# Patient Record
Sex: FEMALE | Race: WHITE | NOT HISPANIC OR LATINO
[De-identification: names, ages, dates, MRNs, and addresses within clinical notes are randomized per-mention and may not be internally consistent; named-entity substitution may affect disease eponyms.]

---

## 2020-01-20 ENCOUNTER — APPOINTMENT (OUTPATIENT)
Dept: ORTHOPEDIC SURGERY | Facility: CLINIC | Age: 60
End: 2020-01-20
Payer: COMMERCIAL

## 2020-01-20 VITALS
HEIGHT: 64 IN | DIASTOLIC BLOOD PRESSURE: 92 MMHG | BODY MASS INDEX: 27.31 KG/M2 | SYSTOLIC BLOOD PRESSURE: 134 MMHG | WEIGHT: 160 LBS | HEART RATE: 78 BPM

## 2020-01-20 DIAGNOSIS — Z78.9 OTHER SPECIFIED HEALTH STATUS: ICD-10-CM

## 2020-01-20 DIAGNOSIS — Z86.39 PERSONAL HISTORY OF OTHER ENDOCRINE, NUTRITIONAL AND METABOLIC DISEASE: ICD-10-CM

## 2020-01-20 PROBLEM — Z00.00 ENCOUNTER FOR PREVENTIVE HEALTH EXAMINATION: Status: ACTIVE | Noted: 2020-01-20

## 2020-01-20 PROCEDURE — 29125 APPL SHORT ARM SPLINT STATIC: CPT | Mod: RT

## 2020-01-20 PROCEDURE — 99204 OFFICE O/P NEW MOD 45 MIN: CPT | Mod: 25

## 2020-01-20 RX ORDER — ROSUVASTATIN CALCIUM 10 MG/1
10 TABLET, FILM COATED ORAL
Refills: 0 | Status: ACTIVE | COMMUNITY

## 2020-01-20 RX ORDER — SERTRALINE HYDROCHLORIDE 50 MG/1
50 TABLET, FILM COATED ORAL
Refills: 0 | Status: ACTIVE | COMMUNITY

## 2020-01-20 RX ORDER — ASPIRIN 81 MG
81 TABLET, DELAYED RELEASE (ENTERIC COATED) ORAL
Refills: 0 | Status: ACTIVE | COMMUNITY

## 2020-01-20 NOTE — HISTORY OF PRESENT ILLNESS
[Right] : right hand dominant [FreeTextEntry1] : She comes in today for evaluation of a right wrist fracture secondary to a fall that occurred 3 days ago. She was seen at [facility] where she had x-rays. She denies prior injuries. She rates her pain a 6 out of 10 at this time.\par \par She was referred by Dr. Lee.

## 2020-01-20 NOTE — END OF VISIT
[FreeTextEntry3] : All medical record entries made by the Scribe were at my, Dr. Morse, direction and personally dictated by me on 01/20/2020. I have reviewed the chart and agree that the record accurately reflects my personal performances of the history, physical exam, assessment, and plan. I have also personally directed, reviewed, and agreed with the chart.\par

## 2020-01-20 NOTE — PHYSICAL EXAM
[de-identified] : I reviewed PA, lateral and oblique radiographs of her right wrist from 3 days ago.  These demonstrate an intra-articular distal radius fracture with dorsal comminution.  There appears to be some loss of bone dorsally. [de-identified] : - Constitutional: This is a female in no obvious distress.  \par - Psych: Patient is alert and oriented to person, place and time.  Patient has a normal mood and affect.\par - Cardiovascular: Normal pulses throughout the upper extremities.  No significant varicosities are noted in the upper extremities. \par - Neuro: Strength and sensation are intact throughout the upper extremities.  Patient has normal coordination.\par - Respiratory:  Patient exhibits no evidence of shortness of breath or difficulty breathing.\par - Skin: No rashes, lesions, or other abnormalities are noted in the upper extremities.\par \par ---\par \par Examination of her right wrist after the splint was removed demonstrates diffuse swelling.  She is tender along the distal radius dorsally.  She has some limitation of terminal flexion and extension of the digits.  She is neurovascularly intact distally.

## 2020-01-20 NOTE — ADDENDUM
[FreeTextEntry1] : I, Darrel Pineda, acted solely as a scribe for Dr. Morse on this date 01/20/2020.\par

## 2020-01-20 NOTE — PROCEDURE
[FreeTextEntry1] : She was placed into a well-padded and well-molded right short arm reverse sugar tong splint.  She was instructed on elevation, ice and range of motion exercises to the digits.  She will follow-up in 3 days in the [place] office to review her CAT scan.

## 2020-01-20 NOTE — DISCUSSION/SUMMARY
[FreeTextEntry1] : She has findings consistent with an intra-articular right distal radius fracture with dorsal comminution.\par \par I had a discussion regarding today's visit, the diagnosis, and treatment recommendations / options. At this time recommended placement of a splint and I ordered a CT scan to better evaluate the displacement of her fracture.  She will follow-up after the CAT scan in 3 days to review the results and discuss treatment recommendations.\par \par The patient has agreed to this plan of management and has expressed full understanding.  All questions were fully answered to the patient's satisfaction.\par \par Over 50% of the time spent with the patient was on counseling the patient on the above diagnosis, treatment plan and prognosis.\par

## 2020-01-23 ENCOUNTER — APPOINTMENT (OUTPATIENT)
Dept: CT IMAGING | Facility: CLINIC | Age: 60
End: 2020-01-23
Payer: COMMERCIAL

## 2020-01-23 PROCEDURE — 73200 CT UPPER EXTREMITY W/O DYE: CPT | Mod: RT

## 2020-01-30 ENCOUNTER — APPOINTMENT (OUTPATIENT)
Dept: ORTHOPEDIC SURGERY | Facility: CLINIC | Age: 60
End: 2020-01-30
Payer: COMMERCIAL

## 2020-01-30 VITALS — BODY MASS INDEX: 10.25 KG/M2 | HEIGHT: 64 IN | WEIGHT: 60 LBS

## 2020-01-30 PROCEDURE — 99214 OFFICE O/P EST MOD 30 MIN: CPT | Mod: 25

## 2020-01-30 PROCEDURE — 29075 APPL CST ELBW FNGR SHORT ARM: CPT | Mod: RT

## 2020-01-30 NOTE — ADDENDUM
[FreeTextEntry1] : I, Darrel Pineda, acted solely as a scribe for Dr. Morse on this date 01/30/2020.\par

## 2020-01-30 NOTE — PROCEDURE
[FreeTextEntry1] : She was placed into a well-padded and well-molded right short arm fiberglass cast. She was instructed on cast care and activity modification. She will follow-up in 1 week for repeat x-rays.

## 2020-01-30 NOTE — HISTORY OF PRESENT ILLNESS
[Right] : right hand dominant [FreeTextEntry1] : 13 days status post right distal radius fracture.  See note from when she was seen in the office 10 days ago.  I ordered a CAT scan of her right wrist to better evaluate her intra-articular displacement.  She comes in to review the results and discuss treatment recommendations.

## 2020-01-30 NOTE — PHYSICAL EXAM
[de-identified] : - Constitutional: This is a female in no obvious distress.  \par - Psych: Patient is alert and oriented to person, place and time.  Patient has a normal mood and affect.\par - Cardiovascular: Normal pulses throughout the upper extremities.  No significant varicosities are noted in the upper extremities. \par - Neuro: Strength and sensation are intact throughout the upper extremities.  Patient has normal coordination.\par - Respiratory:  Patient exhibits no evidence of shortness of breath or difficulty breathing.\par - Skin: No rashes, lesions, or other abnormalities are noted in the upper extremities.\par \par ---\par \par Examination of her right wrist demonstrates her splint to be well fitting.  There is decreased swelling of the digits.  She has some limitation of terminal flexion and extension of the digits.  She is neurovascularly intact distally. [de-identified] : I reviewed the CAT scan of her right wrist.  This demonstrated a comminuted distal radius fracture dorsally.  There was approximately 10 degrees of dorsal angulation on the lateral.  There is also an intra-articular fragment noted ulnarly with approximately 1 mm of displacement at the articular surface.

## 2020-01-30 NOTE — DISCUSSION/SUMMARY
[FreeTextEntry1] : I reviewed the CAT scan results with her.  I had a discussion regarding today's visit, the diagnosis and treatment recommendations / options.  I did discuss the displacement of the fracture and I did recommend she consider ORIF.  She told me that she cannot consider surgery, because she is caring for her elderly mom at home.  She has refused surgical intervention.  She understands that she is at risk for stiffness and possibly arthritis.  However, the displacement is not severe.  I will place her into a cast today and she will follow-up in 1 week for repeat x-rays.\par \par The patient has agreed to the above plan of management and has expressed full understanding.  All questions were fully answered to the patient's satisfaction.\par \par I spent at least 25 minutes of face-to-face time with the patient.  Over 50% of this time was spent on counseling the patient on the above diagnosis, treatment plan and prognosis.

## 2020-01-30 NOTE — END OF VISIT
[FreeTextEntry3] : All medical record entries made by the Scribe were at my, Dr. Morse, direction and personally dictated by me on 01/30/2020. I have reviewed the chart and agree that the record accurately reflects my personal performances of the history, physical exam, assessment, and plan. I have also personally directed, reviewed, and agreed with the chart.\par

## 2020-02-06 ENCOUNTER — APPOINTMENT (OUTPATIENT)
Dept: ORTHOPEDIC SURGERY | Facility: CLINIC | Age: 60
End: 2020-02-06
Payer: COMMERCIAL

## 2020-02-06 VITALS — WEIGHT: 60 LBS | HEIGHT: 64 IN | BODY MASS INDEX: 10.25 KG/M2

## 2020-02-06 PROCEDURE — 73110 X-RAY EXAM OF WRIST: CPT | Mod: RT

## 2020-02-06 PROCEDURE — 99213 OFFICE O/P EST LOW 20 MIN: CPT

## 2020-02-06 NOTE — DISCUSSION/SUMMARY
[FreeTextEntry1] : Again, she has deferred surgery.  She told me that she cannot consider surgery because she is caring for an elderly mother.  She understands that if she has a problem in the future, then she may require further treatment, possibly an osteotomy.\par \par She was instructed on continued cast care and range of motion exercises to the digits.  She will follow-up in 3 weeks for x-rays out of plaster.\par \par The patient has agreed to the above plan of management and has expressed full understanding.  All questions were fully answered to the patient's satisfaction.\par \par I spent at least 25 minutes of face-to-face time with the patient.  Over 50% of this time was spent on counseling the patient on the above diagnosis, treatment plan and prognosis.

## 2020-02-06 NOTE — PHYSICAL EXAM
[de-identified] : - Constitutional: This is a female in no obvious distress.  \par - Psych: Patient is alert and oriented to person, place and time.  Patient has a normal mood and affect.\par - Cardiovascular: Normal pulses throughout the upper extremities.  No significant varicosities are noted in the upper extremities. \par - Neuro: Strength and sensation are intact throughout the upper extremities.  Patient has normal coordination.\par - Respiratory:  Patient exhibits no evidence of shortness of breath or difficulty breathing.\par - Skin: No rashes, lesions, or other abnormalities are noted in the upper extremities.\par \par ---\par \par Examination of her right wrist demonstrates her cast to be well fitting.  There is decreased swelling of the digits.  She has full flexion and extension of the digits.  She remains neurovascularly intact distally. [de-identified] : PA, lateral oblique radiographs of her right wrist demonstrate her distal radius fracture somewhat further displaced.  There appears to be some increased dorsal angulation on the lateral with approximately 20 degrees of dorsal angulation with dorsal comminution.\par \par A CAT scan of her right wrist demonstrated a comminuted distal radius fracture dorsally.  There was approximately 10 degrees of dorsal angulation on the lateral.  There is also an intra-articular fragment noted ulnarly with approximately 1 mm of displacement at the articular surface.

## 2020-02-06 NOTE — HISTORY OF PRESENT ILLNESS
[Right] : right hand dominant [FreeTextEntry1] : 20 days status post right distal radius fracture.  See note from when she was seen in the office 1 week ago.  She deferred surgery.\par \par She is doing well.

## 2020-02-27 ENCOUNTER — APPOINTMENT (OUTPATIENT)
Dept: ORTHOPEDIC SURGERY | Facility: CLINIC | Age: 60
End: 2020-02-27
Payer: COMMERCIAL

## 2020-02-27 VITALS — WEIGHT: 60 LBS | BODY MASS INDEX: 10.25 KG/M2 | HEIGHT: 64 IN

## 2020-02-27 PROCEDURE — 99213 OFFICE O/P EST LOW 20 MIN: CPT

## 2020-02-27 PROCEDURE — 73110 X-RAY EXAM OF WRIST: CPT | Mod: RT

## 2020-02-27 NOTE — ADDENDUM
[FreeTextEntry1] : I, Darrel Pineda, acted solely as a scribe for Dr. Morse on this date 02/27/2020.\par

## 2020-02-27 NOTE — END OF VISIT
[FreeTextEntry3] : All medical record entries made by the Scribe were at my, Dr. Morse, direction and personally dictated by me on 02/27/2020. I have reviewed the chart and agree that the record accurately reflects my personal performances of the history, physical exam, assessment, and plan. I have also personally directed, reviewed, and agreed with the chart.\par

## 2020-02-27 NOTE — HISTORY OF PRESENT ILLNESS
[Right] : right hand dominant [FreeTextEntry1] : 41 days status post right distal radius fracture.  She deferred surgery.\par \par She is doing well. She has no complaints at this time.

## 2020-02-27 NOTE — DISCUSSION/SUMMARY
[FreeTextEntry1] : I discussed the radiographic findings with her.  Again, she has deferred surgery.  She understands that she may have an unacceptable result and if she does, the only option in the future would be an osteotomy.  However, she is, again, taking care of her elderly mother and would not consider surgery.  She was instructed on protection with a splint and gentle range of motion exercises and protection.  She will follow-up in 2 weeks to assess her progress.\par \par The patient has agreed to the above plan of management and has expressed full understanding.  All questions were fully answered to the patient's satisfaction.\par \par I spent at least 25 minutes of face-to-face time with the patient.  Over 50% of this time was spent on counseling the patient on the above diagnosis, treatment plan and prognosis.

## 2020-02-27 NOTE — PHYSICAL EXAM
[de-identified] : PA, lateral oblique radiographs of her right wrist demonstrate her distal radius fracture to be further displaced.  There is approximately 25 degrees of dorsal angulation with shortening and comminution.\par \par A CAT scan of her right wrist demonstrated a comminuted distal radius fracture dorsally.  There was approximately 10 degrees of dorsal angulation on the lateral.  There is also an intra-articular fragment noted ulnarly with approximately 1 mm of displacement at the articular surface. [de-identified] : - Constitutional: This is a female in no obvious distress.  \par - Psych: Patient is alert and oriented to person, place and time.  Patient has a normal mood and affect.\par - Cardiovascular: Normal pulses throughout the upper extremities.  No significant varicosities are noted in the upper extremities. \par - Neuro: Strength and sensation are intact throughout the upper extremities.  Patient has normal coordination.\par - Respiratory:  Patient exhibits no evidence of shortness of breath or difficulty breathing.\par - Skin: No rashes, lesions, or other abnormalities are noted in the upper extremities.\par \par ---\par \par Examination of her right wrist after the cast was removed demonstrates residual swelling.  She remains tender dorsally over the distal radius.  There is decreased swelling of the digits.  She has full flexion and extension of the digits.  She remains neurovascularly intact distally.

## 2020-03-12 ENCOUNTER — APPOINTMENT (OUTPATIENT)
Dept: ORTHOPEDIC SURGERY | Facility: CLINIC | Age: 60
End: 2020-03-12
Payer: COMMERCIAL

## 2020-03-12 VITALS — WEIGHT: 60 LBS | HEIGHT: 64 IN | BODY MASS INDEX: 10.25 KG/M2

## 2020-03-12 PROCEDURE — 73110 X-RAY EXAM OF WRIST: CPT | Mod: RT

## 2020-03-12 PROCEDURE — 99213 OFFICE O/P EST LOW 20 MIN: CPT

## 2020-03-12 NOTE — ADDENDUM
[FreeTextEntry1] : I, Darrel Pineda, acted solely as a scribe for Dr. Morse on this date 03/12/2020.\par

## 2020-03-12 NOTE — HISTORY OF PRESENT ILLNESS
[Right] : right hand dominant [FreeTextEntry1] : 55 days status post right distal radius fracture.  She deferred surgery.\par \par She is doing well. She states she has intermittent mild pain. She has difficulty with writing. She otherwise feels improved and has no other complaints at this time.\par \par

## 2020-03-12 NOTE — PHYSICAL EXAM
[de-identified] : - Constitutional: This is a female in no obvious distress.  \par - Psych: Patient is alert and oriented to person, place and time.  Patient has a normal mood and affect.\par - Cardiovascular: Normal pulses throughout the upper extremities.  No significant varicosities are noted in the upper extremities. \par - Neuro: Strength and sensation are intact throughout the upper extremities.  Patient has normal coordination.\par - Respiratory:  Patient exhibits no evidence of shortness of breath or difficulty breathing.\par - Skin: No rashes, lesions, or other abnormalities are noted in the upper extremities.\par \par ---\par \par Examination of her right wrist demonstrates decreased swelling.  She has decreased tenderness dorsally over the distal radius.  There is decreased swelling of the digits.  She has full flexion and extension of the digits.  She has 10 degrees of wrist flexion and 30 degrees of extension.  She remains neurovascularly intact distally. [de-identified] : PA, lateral oblique radiographs of her right wrist demonstrate her distal radius fracture to be further healed.  Again, there is dorsal comminution with shortening and 30 degrees angulation dorsally on the lateral.

## 2020-03-12 NOTE — END OF VISIT
[FreeTextEntry3] : All medical record entries made by the Scribe were at my, Dr. Morse, direction and personally dictated by me on 03/12/2020. I have reviewed the chart and agree that the record accurately reflects my personal performances of the history, physical exam, assessment, and plan. I have also personally directed, reviewed, and agreed with the chart.\par

## 2020-03-12 NOTE — DISCUSSION/SUMMARY
[FreeTextEntry1] : She was instructed on continued range of motion exercises, and she will advance her activities, according to her symptoms.  She will wean off of the splint but will wear when she is outside the house as well as when she is lifting her mother.  She does not have time to go for formal occupational therapy.  She will follow-up in 4 weeks.  Again, she previously deferred surgery.  She understands that she may have an unacceptable result and if she does, the only option in the future would be an osteotomy.  \par \par She also reports that she sustained a fall last night and asked me for a reference to a neurologist.  She has had repeated falls.  I referred her to Dr. Schreiber.\par \par The patient has agreed to the above plan of management and has expressed full understanding.  All questions were fully answered to the patient's satisfaction.\par \par I spent at least 25 minutes of face-to-face time with the patient.  Over 50% of this time was spent on counseling the patient on the above diagnosis, treatment plan and prognosis.

## 2020-05-04 PROBLEM — S52.501A DISTAL RADIUS FRACTURE, RIGHT: Status: ACTIVE | Noted: 2020-01-20

## 2020-05-07 ENCOUNTER — APPOINTMENT (OUTPATIENT)
Dept: ORTHOPEDIC SURGERY | Facility: CLINIC | Age: 60
End: 2020-05-07
Payer: COMMERCIAL

## 2020-05-07 VITALS — WEIGHT: 160 LBS | HEIGHT: 64 IN | BODY MASS INDEX: 27.31 KG/M2

## 2020-05-07 DIAGNOSIS — S52.501A UNSPECIFIED FRACTURE OF THE LOWER END OF RIGHT RADIUS, INITIAL ENCOUNTER FOR CLOSED FRACTURE: ICD-10-CM

## 2020-05-07 PROCEDURE — 73110 X-RAY EXAM OF WRIST: CPT | Mod: 50

## 2020-05-07 PROCEDURE — 99214 OFFICE O/P EST MOD 30 MIN: CPT | Mod: 25

## 2020-05-07 PROCEDURE — 29075 APPL CST ELBW FNGR SHORT ARM: CPT | Mod: LT

## 2020-05-07 NOTE — HISTORY OF PRESENT ILLNESS
[Right] : right hand dominant [FreeTextEntry1] : Almost 3 months status post right distal radius fracture.  She deferred surgery.\par \par She was last seen in the office almost 2 months ago.\par \par She returns today reporting that she fractured her left wrist after sustaining a fall 6 days ago. She was seen at [facility] where she and x-rays taken.  She has minimal discomfort.  She states that the right wrist is much improved.\par \par She has had multiple falls and is seeing Dr. Schreiber, who is a neurologist.\par \par

## 2020-05-07 NOTE — PHYSICAL EXAM
[de-identified] : - Constitutional: This is a female in no obvious distress.  \par - Psych: Patient is alert and oriented to person, place and time.  Patient has a normal mood and affect.\par - Cardiovascular: Normal pulses throughout the upper extremities.  No significant varicosities are noted in the upper extremities. \par - Neuro: Strength and sensation are intact throughout the upper extremities.  Patient has normal coordination.\par - Respiratory:  Patient exhibits no evidence of shortness of breath or difficulty breathing.\par - Skin: No rashes, lesions, or other abnormalities are noted in the upper extremities.\par \par ---\par \par Examination of her right wrist demonstrates minimal residual swelling.  There is no residual tenderness dorsally over the distal radius.  There is decreased swelling of the digits.  She has full flexion and extension of the digits.  She has 30 degrees of wrist flexion and 30 degrees of extension.  She remains neurovascularly intact distally.\par \par Examination of her left wrist demonstrates swelling and ecchymosis dorsally.  She is tender along the distal radius.  She has appropriate flexion and extension of the digits.  She is neurovascularly intact distally. [de-identified] : PA, lateral oblique radiographs of her right wrist demonstrate her distal radius fracture to be fully healed.  Again, there is dorsal comminution with shortening and 30 degrees angulation dorsally on the lateral.\par \par PA, lateral and oblique radiographs of her left wrist demonstrate a distal radius fracture.  There is minimal shortening and approximately 5 degrees of dorsal tilt on the lateral.

## 2020-05-07 NOTE — ADDENDUM
[FreeTextEntry1] : I, Darrel Pineda, acted solely as a scribe for Dr. Morse on this date 05/07/2020.\par

## 2020-05-07 NOTE — DISCUSSION/SUMMARY
[FreeTextEntry1] : With regard to the right wrist, she was instructed on continued range of motion exercises.\par \par With regard to the left wrist, I recommended placement of a cast and follow-up in 1 week.\par \par The patient has agreed to the above plan of management and has expressed full understanding.  All questions were fully answered to the patient's satisfaction.\par \par I spent at least 25 minutes of face-to-face time with the patient.  Over 50% of this time was spent on counseling the patient on the above diagnosis, treatment plan and prognosis.

## 2020-05-07 NOTE — END OF VISIT
[FreeTextEntry3] : All medical record entries made by the Scribe were at my, Dr. Morse, direction and personally dictated by me on 05/07/2020. I have reviewed the chart and agree that the record accurately reflects my personal performances of the history, physical exam, assessment, and plan. I have also personally directed, reviewed, and agreed with the chart.\par

## 2020-05-07 NOTE — PROCEDURE
[FreeTextEntry1] : She was placed into a well-padded and well molded left short arm fiberglass cast.  Instructed on cast care and activity modification.  She will follow-up in 1 week for repeat x-rays.

## 2020-05-14 ENCOUNTER — TRANSCRIPTION ENCOUNTER (OUTPATIENT)
Age: 60
End: 2020-05-14

## 2020-05-14 ENCOUNTER — APPOINTMENT (OUTPATIENT)
Dept: ORTHOPEDIC SURGERY | Facility: CLINIC | Age: 60
End: 2020-05-14
Payer: COMMERCIAL

## 2020-05-14 VITALS — BODY MASS INDEX: 27.31 KG/M2 | HEIGHT: 64 IN | WEIGHT: 160 LBS

## 2020-05-14 PROCEDURE — 99214 OFFICE O/P EST MOD 30 MIN: CPT

## 2020-05-14 PROCEDURE — 73110 X-RAY EXAM OF WRIST: CPT | Mod: LT

## 2020-05-14 NOTE — HISTORY OF PRESENT ILLNESS
[FreeTextEntry1] : 13 days status post left distal radius fracture.  She was casted 1 week ago.  \par \par She is doing well and has decreased pain.\par \par She also sustained a right distal radius fracture almost 4 months ago.\par \par She has had multiple falls and is seeing Dr. Schreiber, who is a neurologist.\par \par

## 2020-05-14 NOTE — PHYSICAL EXAM
[de-identified] : - Constitutional: This is a female in no obvious distress.  \par - Psych: Patient is alert and oriented to person, place and time.  Patient has a normal mood and affect.\par - Cardiovascular: Normal pulses throughout the upper extremities.  No significant varicosities are noted in the upper extremities. \par - Neuro: Strength and sensation are intact throughout the upper extremities.  Patient has normal coordination.\par - Respiratory:  Patient exhibits no evidence of shortness of breath or difficulty breathing.\par - Skin: No rashes, lesions, or other abnormalities are noted in the upper extremities.\par \par ---\par \par Examination of her left wrist demonstrates her cast to be well fitting.  She has near full flexion and extension of the digits.  She is neurovascularly intact distally. [de-identified] : PA, lateral oblique radiographs of her left wrist demonstrate some further collapse of her fracture.  There is approximately 15 degrees of dorsal angulation on the lateral with good alignment on the PA.

## 2020-05-14 NOTE — DISCUSSION/SUMMARY
[FreeTextEntry1] : I had a discussion regarding today's visit, the diagnosis and treatment recommendations / options.  I did discuss the radiographic findings with her.  As previously noted, she would not consider surgery.  I discussed that there is some further collapse of the fracture.  She will be maintained in the cast and follow-up in 1 week for repeat x-rays to make certain that there is not further significant collapse in the interim.\par \par The patient has agreed to the above plan of management and has expressed full understanding.  All questions were fully answered to the patient's satisfaction.\par \par I spent at least 25 minutes of face-to-face time with the patient.  Over 50% of this time was spent on counseling the patient on the above diagnosis, treatment plan and prognosis.

## 2020-05-21 ENCOUNTER — APPOINTMENT (OUTPATIENT)
Dept: ORTHOPEDIC SURGERY | Facility: CLINIC | Age: 60
End: 2020-05-21
Payer: COMMERCIAL

## 2020-05-21 VITALS — BODY MASS INDEX: 27.31 KG/M2 | WEIGHT: 160 LBS | HEIGHT: 64 IN

## 2020-05-21 PROCEDURE — 29075 APPL CST ELBW FNGR SHORT ARM: CPT | Mod: 58,LT

## 2020-05-21 PROCEDURE — 99213 OFFICE O/P EST LOW 20 MIN: CPT | Mod: 25

## 2020-05-21 PROCEDURE — 73110 X-RAY EXAM OF WRIST: CPT | Mod: LT

## 2020-05-21 NOTE — PHYSICAL EXAM
[de-identified] : PA, lateral oblique radiographs of her left wrist demonstrate further collapse of her fracture.  There is approximately 25 degrees of dorsal angulation on the lateral with acceptable alignment on the PA. [de-identified] : - Constitutional: This is a female in no obvious distress.  \par - Psych: Patient is alert and oriented to person, place and time.  Patient has a normal mood and affect.\par - Cardiovascular: Normal pulses throughout the upper extremities.  No significant varicosities are noted in the upper extremities. \par - Neuro: Strength and sensation are intact throughout the upper extremities.  Patient has normal coordination.\par - Respiratory:  Patient exhibits no evidence of shortness of breath or difficulty breathing.\par - Skin: No rashes, lesions, or other abnormalities are noted in the upper extremities.\par \par ---\par \par Examination of her left wrist after her cast was removed demonstrates decreased swelling.  She has near full flexion and extension of the digits.  She is neurovascularly intact distally.

## 2020-05-21 NOTE — ADDENDUM
[FreeTextEntry1] : I, Darrel Pineda, acted solely as a scribe for Dr. Morse on this date 05/21/2020.\par

## 2020-05-21 NOTE — HISTORY OF PRESENT ILLNESS
[Right] : right hand dominant [FreeTextEntry1] : 20 days status post left distal radius fracture.  \par \par She is doing well and has decreased pain.\par \par She also sustained a right distal radius fracture almost 4 months ago.\par \par She has had multiple falls and is seeing Dr. Schreiber, who is a neurologist.\par \par

## 2020-05-21 NOTE — DISCUSSION/SUMMARY
[FreeTextEntry1] : I had a discussion regarding today's visit, the diagnosis and treatment recommendations / options.  I discussed the radiographic findings with her.  As previously noted, she would not consider surgery.  Again, she understands that she will have some limitation of motion may have further problems with regard to the fracture.  She will follow-up in 3 weeks for x-rays out of plaster.\par \par The patient has agreed to the above plan of management and has expressed full understanding.  All questions were fully answered to the patient's satisfaction.\par \par I spent at least 25 minutes of face-to-face time with the patient.  Over 50% of this time was spent on counseling the patient on the above diagnosis, treatment plan and prognosis.

## 2020-05-21 NOTE — PROCEDURE
[FreeTextEntry1] : She was placed back into a new well-padded and well molded left short arm fiberglass cast.  She was instructed on cast care and range of motion exercises to the digits.  She will follow-up in 3 weeks for x-rays out of plaster.

## 2020-05-21 NOTE — END OF VISIT
[FreeTextEntry3] : All medical record entries made by the Scribe were at my, Dr. Morse, direction and personally dictated by me on 05/21/2020. I have reviewed the chart and agree that the record accurately reflects my personal performances of the history, physical exam, assessment, and plan. I have also personally directed, reviewed, and agreed with the chart.\par

## 2020-06-04 ENCOUNTER — APPOINTMENT (OUTPATIENT)
Dept: ORTHOPEDIC SURGERY | Facility: CLINIC | Age: 60
End: 2020-06-04
Payer: COMMERCIAL

## 2020-06-04 PROCEDURE — 99213 OFFICE O/P EST LOW 20 MIN: CPT

## 2020-06-04 PROCEDURE — 73110 X-RAY EXAM OF WRIST: CPT | Mod: LT

## 2020-06-04 NOTE — DISCUSSION/SUMMARY
[FreeTextEntry1] : I had a discussion regarding today's visit, the diagnosis and treatment recommendations / options.  At this time, she was instructed on protection and stabilization with use of a splint. She may apply ice as needed.  In 1 week she will begin to remove the splint and perform gentle range of motion exercises.  She will follow-up in 2 weeks.\par \par The patient has agreed to the above plan of management and has expressed full understanding.  All questions were fully answered to the patient's satisfaction.\par \par I spent at least 25 minutes of face-to-face time with the patient.  Over 50% of this time was spent on counseling the patient on the above diagnosis, treatment plan and prognosis.

## 2020-06-04 NOTE — ADDENDUM
[FreeTextEntry1] : I, Darrel Pineda, acted solely as a scribe for Dr. Morse on this date 06/04/2020.\par

## 2020-06-04 NOTE — END OF VISIT
[FreeTextEntry3] : All medical record entries made by the Scribe were at my, Dr. Morse, direction and personally dictated by me on 06/04/2020. I have reviewed the chart and agree that the record accurately reflects my personal performances of the history, physical exam, assessment, and plan. I have also personally directed, reviewed, and agreed with the chart.\par

## 2020-06-04 NOTE — PHYSICAL EXAM
[de-identified] : - Constitutional: This is a female in no obvious distress.  \par - Psych: Patient is alert and oriented to person, place and time.  Patient has a normal mood and affect.\par - Cardiovascular: Normal pulses throughout the upper extremities.  No significant varicosities are noted in the upper extremities. \par - Neuro: Strength and sensation are intact throughout the upper extremities.  Patient has normal coordination.\par - Respiratory:  Patient exhibits no evidence of shortness of breath or difficulty breathing.\par - Skin: No rashes, lesions, or other abnormalities are noted in the upper extremities.\par \par ---\par \par Examination of her left wrist after her cast was removed demonstrates residual swelling.  There is some residual tenderness along the distal radius dorsally.  She has near full flexion and extension of the digits.  She is neurovascularly intact distally. [de-identified] : PA, lateral oblique radiographs of her left wrist demonstrate no further collapse of her fracture.  There is interval healing.  There is approximately 25 degrees of dorsal angulation on the lateral with acceptable alignment on the PA.

## 2020-06-04 NOTE — HISTORY OF PRESENT ILLNESS
[FreeTextEntry1] : 34 days status post left distal radius fracture.  \par \par She returns today, as the cast is too tight and she is having pain.\par \par She also sustained a right distal radius fracture almost 4 months ago.\par \par She has had multiple falls and is seeing Dr. Schreiber, who is a neurologist.\par \par

## 2020-06-11 ENCOUNTER — APPOINTMENT (OUTPATIENT)
Dept: ORTHOPEDIC SURGERY | Facility: CLINIC | Age: 60
End: 2020-06-11

## 2020-06-15 PROBLEM — S52.502A DISTAL RADIUS FRACTURE, LEFT: Status: ACTIVE | Noted: 2020-05-07

## 2020-06-18 ENCOUNTER — APPOINTMENT (OUTPATIENT)
Dept: ORTHOPEDIC SURGERY | Facility: CLINIC | Age: 60
End: 2020-06-18
Payer: COMMERCIAL

## 2020-06-18 DIAGNOSIS — S52.502A UNSPECIFIED FRACTURE OF THE LOWER END OF LEFT RADIUS, INITIAL ENCOUNTER FOR CLOSED FRACTURE: ICD-10-CM

## 2020-06-18 PROCEDURE — 73110 X-RAY EXAM OF WRIST: CPT | Mod: LT

## 2020-06-18 PROCEDURE — 99213 OFFICE O/P EST LOW 20 MIN: CPT

## 2020-06-18 NOTE — HISTORY OF PRESENT ILLNESS
[FreeTextEntry1] : 48 days status post left distal radius fracture.  \par \par She is doing well and is much improved.  She has some residual pain.\par \par She has had multiple falls and is seeing Dr. Schreiber, who is a neurologist.\par \par

## 2021-02-23 ENCOUNTER — EMERGENCY (EMERGENCY)
Facility: HOSPITAL | Age: 61
LOS: 1 days | End: 2021-02-23
Admitting: EMERGENCY MEDICINE
Payer: COMMERCIAL

## 2021-02-23 PROCEDURE — 70450 CT HEAD/BRAIN W/O DYE: CPT | Mod: 26

## 2021-02-23 PROCEDURE — 70486 CT MAXILLOFACIAL W/O DYE: CPT | Mod: 26

## 2021-02-23 PROCEDURE — 72125 CT NECK SPINE W/O DYE: CPT | Mod: 26

## 2021-02-23 PROCEDURE — 99285 EMERGENCY DEPT VISIT HI MDM: CPT

## 2021-08-24 NOTE — PHYSICAL EXAM
[de-identified] : - Constitutional: This is a female in no obvious distress.  \par - Psych: Patient is alert and oriented to person, place and time.  Patient has a normal mood and affect.\par - Cardiovascular: Normal pulses throughout the upper extremities.  No significant varicosities are noted in the upper extremities. \par - Neuro: Strength and sensation are intact throughout the upper extremities.  Patient has normal coordination.\par - Respiratory:  Patient exhibits no evidence of shortness of breath or difficulty breathing.\par - Skin: No rashes, lesions, or other abnormalities are noted in the upper extremities.\par \par ---\par \par Examination of her left wrist demonstrates decreased swelling.  There is no residual tenderness dorsally over the distal radius.  She has improved flexion and extension of the wrist.  She has full flexion and extension of the digits.  She is neurovascularly intact distally. [de-identified] : PA, lateral oblique radiographs of her left wrist demonstrate her distal radius fracture to be essentially healed.  There is approximately 25 degrees of dorsal angulation on the lateral with acceptable alignment on the PA. Simple: Patient demonstrates quick and easy understanding/Verbalized Understanding

## 2021-09-08 PROCEDURE — 73630 X-RAY EXAM OF FOOT: CPT | Mod: RT

## 2021-09-08 PROCEDURE — 73610 X-RAY EXAM OF ANKLE: CPT | Mod: RT

## 2022-07-07 ENCOUNTER — APPOINTMENT (OUTPATIENT)
Dept: MRI IMAGING | Facility: CLINIC | Age: 62
End: 2022-07-07

## 2022-07-07 PROCEDURE — A9585: CPT

## 2022-07-07 PROCEDURE — 70553 MRI BRAIN STEM W/O & W/DYE: CPT

## 2024-01-17 ENCOUNTER — APPOINTMENT (OUTPATIENT)
Dept: MRI IMAGING | Facility: CLINIC | Age: 64
End: 2024-01-17
Payer: COMMERCIAL

## 2024-01-17 PROCEDURE — 70551 MRI BRAIN STEM W/O DYE: CPT

## 2024-01-30 ENCOUNTER — APPOINTMENT (OUTPATIENT)
Dept: NUCLEAR MEDICINE | Facility: CLINIC | Age: 64
End: 2024-01-30
Payer: COMMERCIAL

## 2024-01-30 PROCEDURE — A9552: CPT

## 2024-01-30 PROCEDURE — 78608 BRAIN IMAGING (PET): CPT

## 2024-01-30 PROCEDURE — 78999 UNLISTED MISC PX DX NUC MED: CPT
